# Patient Record
Sex: FEMALE | Race: WHITE
[De-identification: names, ages, dates, MRNs, and addresses within clinical notes are randomized per-mention and may not be internally consistent; named-entity substitution may affect disease eponyms.]

---

## 2017-01-10 ENCOUNTER — HOSPITAL ENCOUNTER (OUTPATIENT)
Dept: HOSPITAL 80 - FIMAGING | Age: 62
End: 2017-01-10
Attending: GENERAL ACUTE CARE HOSPITAL
Payer: COMMERCIAL

## 2017-01-10 DIAGNOSIS — Z12.31: Primary | ICD-10-CM

## 2017-01-10 PROCEDURE — G0202 SCR MAMMO BI INCL CAD: HCPCS

## 2017-01-10 NOTE — MA
Screening Digital Mammogram With iCAD Analysis

 

Clinical Indications: Routine screening. The patient had breast augmentation with subsequent removal 
of the implants.

 

Technique: Standard cephalocaudal projections are obtained. Digital breast tomosynthesis was performe
d in the MLO projection with reconstruction at 1.0 mm slice thickness and composite MLO views reconst
ructed. This examination is processed by the iCAD computer aided detection system. 

 

Comparison: September 2015, October 2013, March 2012, May 2011, August 2008.

 

Breast density: Type C: Heterogeneously dense. 

 

Findings: CAD was reviewed. No masses, suspicious calcifications or secondary signs of malignancy are
 seen. There has been no significant change in the appearance of either breast.

 

Impression: Negative mammogram. BI-RADS 1. 

 

Recommendation: Routine mammographic screening in one year as long as physical examination is negativ
e in this patient with heterogeneously dense breast parenchyma.

 

[facility] will send a result letter to the patient.

Negative mammography should not preclude additional workup of a clinically suspicious finding.

The patient's information is entered into a reminder system with a target due date for her next mammo
gram.

## 2018-11-17 ENCOUNTER — HOSPITAL ENCOUNTER (OUTPATIENT)
Dept: HOSPITAL 80 - FIMAGING | Age: 63
End: 2018-11-17
Payer: COMMERCIAL

## 2018-11-17 DIAGNOSIS — G50.1: Primary | ICD-10-CM
